# Patient Record
Sex: MALE | Race: WHITE | NOT HISPANIC OR LATINO | Employment: FULL TIME | URBAN - METROPOLITAN AREA
[De-identification: names, ages, dates, MRNs, and addresses within clinical notes are randomized per-mention and may not be internally consistent; named-entity substitution may affect disease eponyms.]

---

## 2021-05-25 ENCOUNTER — RECORDS - HEALTHEAST (OUTPATIENT)
Dept: ADMINISTRATIVE | Facility: CLINIC | Age: 51
End: 2021-05-25

## 2023-06-05 ENCOUNTER — OFFICE VISIT (OUTPATIENT)
Dept: FAMILY MEDICINE | Facility: CLINIC | Age: 53
End: 2023-06-05
Payer: MEDICAID

## 2023-06-05 VITALS
BODY MASS INDEX: 39.17 KG/M2 | HEIGHT: 75 IN | DIASTOLIC BLOOD PRESSURE: 80 MMHG | RESPIRATION RATE: 16 BRPM | OXYGEN SATURATION: 97 % | SYSTOLIC BLOOD PRESSURE: 124 MMHG | WEIGHT: 315 LBS | HEART RATE: 63 BPM

## 2023-06-05 DIAGNOSIS — E66.01 MORBID OBESITY (H): ICD-10-CM

## 2023-06-05 DIAGNOSIS — F41.1 GENERALIZED ANXIETY DISORDER: ICD-10-CM

## 2023-06-05 DIAGNOSIS — G47.33 OBSTRUCTIVE SLEEP APNEA SYNDROME: ICD-10-CM

## 2023-06-05 DIAGNOSIS — F10.21 ALCOHOL DEPENDENCE IN REMISSION (H): ICD-10-CM

## 2023-06-05 DIAGNOSIS — M1A.00X0 IDIOPATHIC CHRONIC GOUT WITHOUT TOPHUS, UNSPECIFIED SITE: ICD-10-CM

## 2023-06-05 DIAGNOSIS — I10 ESSENTIAL HYPERTENSION: ICD-10-CM

## 2023-06-05 DIAGNOSIS — I87.2 VENOUS (PERIPHERAL) INSUFFICIENCY: Primary | ICD-10-CM

## 2023-06-05 DIAGNOSIS — E78.5 HYPERLIPIDEMIA LDL GOAL <100: ICD-10-CM

## 2023-06-05 LAB
ALBUMIN SERPL BCG-MCNC: 4.2 G/DL (ref 3.5–5.2)
ALP SERPL-CCNC: 49 U/L (ref 40–129)
ALT SERPL W P-5'-P-CCNC: 14 U/L (ref 10–50)
ANION GAP SERPL CALCULATED.3IONS-SCNC: 12 MMOL/L (ref 7–15)
AST SERPL W P-5'-P-CCNC: 17 U/L (ref 10–50)
BILIRUB SERPL-MCNC: 0.2 MG/DL
BUN SERPL-MCNC: 21.6 MG/DL (ref 6–20)
CALCIUM SERPL-MCNC: 9.5 MG/DL (ref 8.6–10)
CHLORIDE SERPL-SCNC: 101 MMOL/L (ref 98–107)
CREAT SERPL-MCNC: 0.85 MG/DL (ref 0.67–1.17)
DEPRECATED HCO3 PLAS-SCNC: 29 MMOL/L (ref 22–29)
GFR SERPL CREATININE-BSD FRML MDRD: >90 ML/MIN/1.73M2
GLUCOSE SERPL-MCNC: 81 MG/DL (ref 70–99)
NT-PROBNP SERPL-MCNC: 898 PG/ML (ref 0–900)
POTASSIUM SERPL-SCNC: 3.8 MMOL/L (ref 3.4–5.3)
PROT SERPL-MCNC: 7.5 G/DL (ref 6.4–8.3)
SODIUM SERPL-SCNC: 142 MMOL/L (ref 136–145)
URATE SERPL-MCNC: 6.7 MG/DL (ref 3.4–7)

## 2023-06-05 PROCEDURE — 80053 COMPREHEN METABOLIC PANEL: CPT | Performed by: FAMILY MEDICINE

## 2023-06-05 PROCEDURE — 84550 ASSAY OF BLOOD/URIC ACID: CPT | Performed by: FAMILY MEDICINE

## 2023-06-05 PROCEDURE — 36415 COLL VENOUS BLD VENIPUNCTURE: CPT | Performed by: FAMILY MEDICINE

## 2023-06-05 PROCEDURE — 99204 OFFICE O/P NEW MOD 45 MIN: CPT | Performed by: FAMILY MEDICINE

## 2023-06-05 PROCEDURE — 83880 ASSAY OF NATRIURETIC PEPTIDE: CPT | Performed by: FAMILY MEDICINE

## 2023-06-05 RX ORDER — ASPIRIN 81 MG/1
81 TABLET ORAL DAILY
COMMUNITY
End: 2023-12-26

## 2023-06-05 RX ORDER — HYDROXYZINE HYDROCHLORIDE 50 MG/1
50 TABLET, FILM COATED ORAL EVERY 6 HOURS PRN
COMMUNITY
End: 2024-08-03

## 2023-06-05 RX ORDER — METOPROLOL SUCCINATE 100 MG/1
100 TABLET, EXTENDED RELEASE ORAL DAILY
COMMUNITY
End: 2024-05-08

## 2023-06-05 RX ORDER — ALLOPURINOL 100 MG/1
100 TABLET ORAL DAILY
COMMUNITY
End: 2023-12-26

## 2023-06-05 RX ORDER — SIMVASTATIN 20 MG
20 TABLET ORAL AT BEDTIME
COMMUNITY

## 2023-06-05 RX ORDER — LEVOTHYROXINE SODIUM 175 UG/1
175 TABLET ORAL DAILY
COMMUNITY
End: 2024-07-05

## 2023-06-05 RX ORDER — LOSARTAN POTASSIUM AND HYDROCHLOROTHIAZIDE 12.5; 1 MG/1; MG/1
1 TABLET ORAL DAILY
COMMUNITY
End: 2023-12-26 | Stop reason: SINTOL

## 2023-06-05 RX ORDER — SERTRALINE HYDROCHLORIDE 100 MG/1
100 TABLET, FILM COATED ORAL DAILY
COMMUNITY
End: 2024-05-08

## 2023-06-05 RX ORDER — NAPROXEN 500 MG/1
500 TABLET ORAL 2 TIMES DAILY WITH MEALS
COMMUNITY
End: 2023-12-26

## 2023-06-05 NOTE — PROGRESS NOTES
Assessment & Plan   Problem List Items Addressed This Visit    None  Visit Diagnoses     Venous (peripheral) insufficiency    -  Primary    Relevant Orders    Comprehensive metabolic panel (BMP + Alb, Alk Phos, ALT, AST, Total. Bili, TP) (Completed)    BNP-N terminal pro (Completed)    Physical Therapy Referral    Idiopathic chronic gout without tophus, unspecified site        Relevant Orders    Comprehensive metabolic panel (BMP + Alb, Alk Phos, ALT, AST, Total. Bili, TP) (Completed)    Uric acid (Completed)    Alcohol dependence in remission (H)        Essential hypertension        Relevant Medications    losartan-hydrochlorothiazide (HYZAAR) 100-12.5 MG tablet    Hyperlipidemia LDL goal <100        Relevant Medications    simvastatin (ZOCOR) 20 MG tablet    Generalized anxiety disorder        Relevant Medications    hydrOXYzine (ATARAX) 50 MG tablet    sertraline (ZOLOFT) 100 MG tablet      Patient is currently in town attending alcohol addiction programming.  He is in a stepdown program so he is allowed to have passes and leave the home during the day to go to work.  It is mandatory that he go to a daily 530 to 6:30 PM meeting.  He is not sure if he is going to move back to the [facility] when he completes the program in 2 months.  In the meantime he would like to establish care with a local provider.    Patient presents to clinic today with bilateral lower extremity swelling right greater than left.  He has previously been diagnosed with venous insufficiency and is on furosemide he believes however we do not have an up-to-date med list available.  He also shares with me that his thyroid-stimulating hormone level was recently checked as was his cholesterol panel.    We will have him meet with either PT or OT here in town at the [facility] for lymphedema clinic to see if we can get some compression hose that he is able to don and doff that provide support for his venous insufficiency.  He has  "no control over his diet at this time because it is provided for him at his group home.  He should try to keep his legs elevated is much as he can however he is working 10-hour days standing on concrete so this is not very realistic for him right now.    He has a history of gout but current meds show that he is on both allopurinol and hydrochlorothiazide.  Once we get an updated list of meds we can have a telephone appointment later this week to go over those and make some suggestions about changes.  Offered referral to [facility] pharmacist.  For now would like to work with me.    Advance care planning also discussed with patient today he was given a form that he is welcome to get completed and return to clinic.    History of obstructive sleep apnea and reports that its been years since he has had any follow-up with this.  We will have him schedule an appointment with Dr. Tobar.    Morbid obesity (hypertension, hyperlipidemia, venous insufficiency, obstructive sleep apnea), encouraged patient to work on increasing exercise and to make healthy food choices in the sense that he is able to while living in this group home.    Alcohol dependence he is completed in a residential intensive 3-month program and is now in a stepdown program.  He has been sober now for about 5 months.    Total time spent reviewing chart and preparing for appointment, with patient for appointment, and time spent charting and coordinating care on the day of the appointment in minutes was: 48                 BMI:   Estimated body mass index is 42.31 kg/m  as calculated from the following:    Height as of this encounter: 1.892 m (6' 2.5\").    Weight as of this encounter: 151.5 kg (334 lb).   Weight management plan: Discussed healthy diet and exercise guidelines        Porsche Robles MD  [facility]    Maria De Jesus Olivares is a 52 year old, presenting for the following health issues:  Establish Care (Pt is currently at " "the [facility]. He needs a PCP while here. ) and Joint Swelling (Pt c/o swollen ankles. )         View : No data to display.              History of Present Illness       Reason for visit:  New doctor    He eats 0-1 servings of fruits and vegetables daily.He consumes 2 sweetened beverage(s) daily.He exercises with enough effort to increase his heart rate 9 or less minutes per day.  He exercises with enough effort to increase his heart rate 4 days per week.   He is taking medications regularly.               Review of Systems         Objective    /80 (BP Location: Right arm, Patient Position: Sitting)   Pulse 63   Resp 16   Ht 1.892 m (6' 2.5\")   Wt (!) 151.5 kg (334 lb)   SpO2 97%   BMI 42.31 kg/m    Body mass index is 42.31 kg/m .  Physical Exam                       "

## 2023-06-05 NOTE — LETTER
Elen 15, 2023      Marshall Kendrick  [address] [address]        Dear ,    We are writing to inform you of your test results.    Test results indicate you may require additional follow up, see comment below.  Your abs are very reassuring. Your electrolytes look normal.  It looks like you may have been slightly dehydrated when your labs were done.  Your uric acid level however is higher than we would want to be for somebody that has a history of gout. I would like you to schedule a follow-up with me so that we can start to work on changing your blood pressure medicines and possibly to adjust your allopurinol.      Resulted Orders   Comprehensive metabolic panel (BMP + Alb, Alk Phos, ALT, AST, Total. Bili, TP)   Result Value Ref Range    Sodium 142 136 - 145 mmol/L    Potassium 3.8 3.4 - 5.3 mmol/L    Chloride 101 98 - 107 mmol/L    Carbon Dioxide (CO2) 29 22 - 29 mmol/L    Anion Gap 12 7 - 15 mmol/L    Urea Nitrogen 21.6 (H) 6.0 - 20.0 mg/dL    Creatinine 0.85 0.67 - 1.17 mg/dL    Calcium 9.5 8.6 - 10.0 mg/dL    Glucose 81 70 - 99 mg/dL    Alkaline Phosphatase 49 40 - 129 U/L    AST 17 10 - 50 U/L    ALT 14 10 - 50 U/L    Protein Total 7.5 6.4 - 8.3 g/dL    Albumin 4.2 3.5 - 5.2 g/dL    Bilirubin Total 0.2 <=1.2 mg/dL    GFR Estimate >90 >60 mL/min/1.73m2      Comment:      eGFR calculated using 2021 CKD-EPI equation.   BNP-N terminal pro   Result Value Ref Range    N Terminal Pro BNP Outpatient 898 0 - 900 pg/mL      Comment:      Reference range shown and results flagged as abnormal are for the outpatient, non acute settings. Establishing a baseline value for each individual patient is useful for follow-up.    Suggested inpatient cut points for confirming diagnosis of CHF in an acute setting are:  >450 pg/mL (age 18 to less than 50)  >900 pg/mL (age 50 to less than 75)  >1800 pg/mL (75 yrs and older)    An inpatient or emergency department NT-proPBNP <300 pg/mL effectively rules out  acute CHF, with 99% negative predictive value.       Uric acid   Result Value Ref Range    Uric Acid 6.7 3.4 - 7.0 mg/dL       If you have any questions or concerns, please call the clinic at the number listed above.       Sincerely,      Porsche Robles MD

## 2023-12-26 ENCOUNTER — OFFICE VISIT (OUTPATIENT)
Dept: FAMILY MEDICINE | Facility: CLINIC | Age: 53
End: 2023-12-26
Payer: COMMERCIAL

## 2023-12-26 VITALS
TEMPERATURE: 98 F | SYSTOLIC BLOOD PRESSURE: 124 MMHG | DIASTOLIC BLOOD PRESSURE: 76 MMHG | WEIGHT: 315 LBS | BODY MASS INDEX: 39.17 KG/M2 | RESPIRATION RATE: 16 BRPM | HEIGHT: 75 IN | OXYGEN SATURATION: 98 % | HEART RATE: 68 BPM

## 2023-12-26 DIAGNOSIS — E03.9 ACQUIRED HYPOTHYROIDISM: ICD-10-CM

## 2023-12-26 DIAGNOSIS — M1A.09X0 IDIOPATHIC CHRONIC GOUT OF MULTIPLE SITES WITHOUT TOPHUS: Primary | ICD-10-CM

## 2023-12-26 DIAGNOSIS — F10.91 ALCOHOL USE DISORDER IN REMISSION: ICD-10-CM

## 2023-12-26 DIAGNOSIS — F41.1 GAD (GENERALIZED ANXIETY DISORDER): ICD-10-CM

## 2023-12-26 DIAGNOSIS — I10 PRIMARY HYPERTENSION: ICD-10-CM

## 2023-12-26 DIAGNOSIS — E78.5 DYSLIPIDEMIA: ICD-10-CM

## 2023-12-26 LAB
ERYTHROCYTE [DISTWIDTH] IN BLOOD BY AUTOMATED COUNT: 14.5 % (ref 10–15)
HCT VFR BLD AUTO: 42.7 % (ref 40–53)
HGB BLD-MCNC: 14 G/DL (ref 13.3–17.7)
MCH RBC QN AUTO: 29.8 PG (ref 26.5–33)
MCHC RBC AUTO-ENTMCNC: 32.8 G/DL (ref 31.5–36.5)
MCV RBC AUTO: 91 FL (ref 78–100)
PLATELET # BLD AUTO: 226 10E3/UL (ref 150–450)
RBC # BLD AUTO: 4.7 10E6/UL (ref 4.4–5.9)
WBC # BLD AUTO: 8.2 10E3/UL (ref 4–11)

## 2023-12-26 PROCEDURE — 80053 COMPREHEN METABOLIC PANEL: CPT | Performed by: INTERNAL MEDICINE

## 2023-12-26 PROCEDURE — 99214 OFFICE O/P EST MOD 30 MIN: CPT | Performed by: INTERNAL MEDICINE

## 2023-12-26 PROCEDURE — 86200 CCP ANTIBODY: CPT | Performed by: INTERNAL MEDICINE

## 2023-12-26 PROCEDURE — 84443 ASSAY THYROID STIM HORMONE: CPT | Performed by: INTERNAL MEDICINE

## 2023-12-26 PROCEDURE — 85027 COMPLETE CBC AUTOMATED: CPT | Performed by: INTERNAL MEDICINE

## 2023-12-26 PROCEDURE — 86140 C-REACTIVE PROTEIN: CPT | Performed by: INTERNAL MEDICINE

## 2023-12-26 PROCEDURE — 84550 ASSAY OF BLOOD/URIC ACID: CPT | Performed by: INTERNAL MEDICINE

## 2023-12-26 PROCEDURE — 36415 COLL VENOUS BLD VENIPUNCTURE: CPT | Performed by: INTERNAL MEDICINE

## 2023-12-26 RX ORDER — PREDNISONE 5 MG/1
TABLET ORAL
Qty: 43 TABLET | Refills: 0 | Status: SHIPPED | OUTPATIENT
Start: 2023-12-26 | End: 2024-01-15

## 2023-12-26 RX ORDER — LOSARTAN POTASSIUM 100 MG/1
100 TABLET ORAL DAILY
Qty: 90 TABLET | Refills: 3 | Status: SHIPPED | OUTPATIENT
Start: 2023-12-26 | End: 2024-08-03

## 2023-12-26 RX ORDER — CELECOXIB 200 MG/1
200 CAPSULE ORAL DAILY
Qty: 90 CAPSULE | Refills: 3 | Status: SHIPPED | OUTPATIENT
Start: 2023-12-26 | End: 2024-02-02

## 2023-12-26 RX ORDER — ALLOPURINOL 100 MG/1
100 TABLET ORAL DAILY
Qty: 90 TABLET | Refills: 3 | Status: SHIPPED | OUTPATIENT
Start: 2023-12-26 | End: 2024-02-02 | Stop reason: DRUGHIGH

## 2023-12-26 ASSESSMENT — ENCOUNTER SYMPTOMS
NAUSEA: 0
UNEXPECTED WEIGHT CHANGE: 0
CHILLS: 0
MYALGIAS: 0
DIARRHEA: 0
DIFFICULTY URINATING: 0
VOMITING: 0
SHORTNESS OF BREATH: 0
PALPITATIONS: 0
BRUISES/BLEEDS EASILY: 0
SLEEP DISTURBANCE: 0
FEVER: 0
ARTHRALGIAS: 1
JOINT SWELLING: 1
ABDOMINAL PAIN: 0
DYSURIA: 0
COUGH: 0
FATIGUE: 0
HEADACHES: 0

## 2023-12-26 NOTE — LETTER
January 4, 2024      Marshall Kendrick  [address] [address]        Dear ,    We are writing to inform you of your test results.    TSH elevated. Be sure to take Levothyroxine every day on an empty stomach.   Inflammatory markers elevated but no evidence of rheumatoid arthritis. Procede with plan and schedule a visit in 2-4 weeks for follow-up.     Resulted Orders   Comprehensive metabolic panel (BMP + Alb, Alk Phos, ALT, AST, Total. Bili, TP)   Result Value Ref Range    Sodium 141 135 - 145 mmol/L      Comment:      Reference intervals for this test were updated on 09/26/2023 to more accurately reflect our healthy population. There may be differences in the flagging of prior results with similar values performed with this method. Interpretation of those prior results can be made in the context of the updated reference intervals.     Potassium 4.1 3.4 - 5.3 mmol/L    Carbon Dioxide (CO2) 26 22 - 29 mmol/L    Anion Gap 11 7 - 15 mmol/L    Urea Nitrogen 15.4 6.0 - 20.0 mg/dL    Creatinine 0.88 0.67 - 1.17 mg/dL    GFR Estimate >90 >60 mL/min/1.73m2    Calcium 9.1 8.6 - 10.0 mg/dL    Chloride 104 98 - 107 mmol/L    Glucose 87 70 - 99 mg/dL    Alkaline Phosphatase 68 40 - 150 U/L      Comment:      Reference intervals for this test were updated on 11/14/2023 to more accurately reflect our healthy population. There may be differences in the flagging of prior results with similar values performed with this method. Interpretation of those prior results can be made in the context of the updated reference intervals.    AST 22 0 - 45 U/L      Comment:      Reference intervals for this test were updated on 6/12/2023 to more accurately reflect our healthy population. There may be differences in the flagging of prior results with similar values performed with this method. Interpretation of those prior results can be made in the context of the updated reference intervals.    ALT 14 0 - 70 U/L       Comment:      Reference intervals for this test were updated on 6/12/2023 to more accurately reflect our healthy population. There may be differences in the flagging of prior results with similar values performed with this method. Interpretation of those prior results can be made in the context of the updated reference intervals.      Protein Total 7.4 6.4 - 8.3 g/dL    Albumin 4.2 3.5 - 5.2 g/dL    Bilirubin Total 0.2 <=1.2 mg/dL   Uric acid   Result Value Ref Range    Uric Acid 6.1 3.4 - 7.0 mg/dL   CBC with platelets   Result Value Ref Range    WBC Count 8.2 4.0 - 11.0 10e3/uL    RBC Count 4.70 4.40 - 5.90 10e6/uL    Hemoglobin 14.0 13.3 - 17.7 g/dL    Hematocrit 42.7 40.0 - 53.0 %    MCV 91 78 - 100 fL    MCH 29.8 26.5 - 33.0 pg    MCHC 32.8 31.5 - 36.5 g/dL    RDW 14.5 10.0 - 15.0 %    Platelet Count 226 150 - 450 10e3/uL   TSH   Result Value Ref Range    TSH 32.70 (H) 0.30 - 4.20 uIU/mL   CRP inflammation   Result Value Ref Range    CRP Inflammation 7.48 (H) <5.00 mg/L   Cyclic Citrullinated Peptide Antibody IgG   Result Value Ref Range    Cyclic Citrullinated Peptide Antibody IgG 1.9 <7.0 U/mL      Comment:      Negative       If you have any questions or concerns, please call the clinic at the number listed above.       Sincerely,      Huang Tobar MD

## 2023-12-26 NOTE — PROGRESS NOTES
Assessment & Plan   Problem List Items Addressed This Visit       Primary hypertension     Controlled with regimen of metoprolol  mg daily, losartan 100 mg daily, hydrochlorothiazide 12.5 mg daily  Discontinue hydrochlorothiazide due to gout         Relevant Medications    losartan (COZAAR) 100 MG tablet    Other Relevant Orders    Comprehensive metabolic panel (BMP + Alb, Alk Phos, ALT, AST, Total. Bili, TP)    PRIMARY CARE FOLLOW-UP SCHEDULING    Idiopathic chronic gout of multiple sites without tophus - Primary     Patient with history of gout presents with 3 weeks of bilateral wrist arthritis  He is now on hydrochlorothiazide and not on allopurinol  Multiple attacks of podagra while in active alcoholic  Differential including rheumatoid arthritis    Lab workup  Resume allopurinol with Celebrex daily  Prednisone taper for acute symptoms  Discontinue hydrochlorothiazide         Relevant Medications    allopurinol (ZYLOPRIM) 100 MG tablet    celecoxib (CELEBREX) 200 MG capsule    predniSONE (DELTASONE) 5 MG tablet    Other Relevant Orders    Comprehensive metabolic panel (BMP + Alb, Alk Phos, ALT, AST, Total. Bili, TP)    Uric acid    CBC with platelets    CRP inflammation    Cyclic Citrullinated Peptide Antibody IgG    PRIMARY CARE FOLLOW-UP SCHEDULING    Alcohol use disorder in remission     Sober since spring 2023         Acquired hypothyroidism    Relevant Orders    TSH    TEZ (generalized anxiety disorder)     Well-controlled for many years with sertraline 100 mg daily         Dyslipidemia     On statin                      Nicotine/Tobacco Cessation:  He reports that he has been smoking cigarettes. He has a 26.3 pack-year smoking history. He has never used smokeless tobacco.  Nicotine/Tobacco Cessation Plan:   Information offered: Patient not interested at this time      BMI:   Estimated body mass index is 42.44 kg/m  as calculated from the following:    Height as of this encounter: 1.892 m (6'  "2.5\").    Weight as of this encounter: 152 kg (335 lb).   Weight management plan: Patient was referred to their PCP to discuss a diet and exercise plan.    Follow-up in a few weeks.    Huang Tobar MD  [facility]    Maria De Jesus Olivares is a 53 year old, presenting for the following health issues:  Musculoskeletal Problem        12/26/2023     4:46 PM   Additional Questions   Roomed by LA       History of Present Illness       Reason for visit:  Soreness in joints  Symptom onset:  3-4 weeks ago  Symptoms include:  Sore wrists knees and feet  Symptom intensity:  Severe  Symptom progression:  Worsening  Had these symptoms before:  Yes  Has tried/received treatment for these symptoms:  Yes  Previous treatment was successful:  Yes  Prior treatment description:  Had goute in feet they were really sore on toes took meds  What makes it worse:  Moving  What makes it better:  No    He eats 0-1 servings of fruits and vegetables daily.He consumes 2 sweetened beverage(s) daily.He exercises with enough effort to increase his heart rate 9 or less minutes per day.  He exercises with enough effort to increase his heart rate 4 days per week. He is missing 1 dose(s) of medications per week.  He is not taking prescribed medications regularly due to remembering to take.     Patient has been sober since spring and it is now nearly 9 months.  He is living in a sober house in [place] and likes it here.  He has a job at a manufacturing plant.  He is very happy with this current situation.  He complains of bilateral wrist pain and swelling for about a month.  Involving the knees but much less so.  Works with his hands in a manufacturing plant.  Was on allopurinol but has not been on that in the number of months and has been started on hydrochlorothiazide for his blood pressure.              Review of Systems   Constitutional:  Negative for chills, fatigue, fever and unexpected weight change.   Respiratory:  " "Negative for cough and shortness of breath.    Cardiovascular:  Negative for chest pain, palpitations and peripheral edema.   Gastrointestinal:  Negative for abdominal pain, diarrhea, nausea and vomiting.   Endocrine: Negative for polyuria.   Genitourinary:  Negative for difficulty urinating and dysuria.   Musculoskeletal:  Positive for arthralgias and joint swelling. Negative for myalgias.   Skin:  Negative for rash.   Neurological:  Negative for headaches.   Hematological:  Does not bruise/bleed easily.   Psychiatric/Behavioral:  Negative for sleep disturbance.             Objective    /76   Pulse 68   Temp 98  F (36.7  C) (Tympanic)   Resp 16   Ht 1.892 m (6' 2.5\")   Wt (!) 152 kg (335 lb)   SpO2 98%   BMI 42.44 kg/m    Body mass index is 42.44 kg/m .  Physical Exam   Obese man in no distress  Alert and oriented  In good spirits  Examination of the joints reveals tenderness and mild swelling of both wrists but not hands feet ankles or knees                      "

## 2023-12-26 NOTE — ASSESSMENT & PLAN NOTE
Controlled with regimen of metoprolol  mg daily, losartan 100 mg daily, hydrochlorothiazide 12.5 mg daily  Discontinue hydrochlorothiazide due to gout

## 2023-12-26 NOTE — ASSESSMENT & PLAN NOTE
Patient with history of gout presents with 3 weeks of bilateral wrist arthritis  He is now on hydrochlorothiazide and not on allopurinol  Multiple attacks of podagra while in active alcoholic  Differential including rheumatoid arthritis    Lab workup  Resume allopurinol with Celebrex daily  Prednisone taper for acute symptoms  Discontinue hydrochlorothiazide

## 2023-12-27 LAB
ALBUMIN SERPL BCG-MCNC: 4.2 G/DL (ref 3.5–5.2)
ALP SERPL-CCNC: 68 U/L (ref 40–150)
ALT SERPL W P-5'-P-CCNC: 14 U/L (ref 0–70)
ANION GAP SERPL CALCULATED.3IONS-SCNC: 11 MMOL/L (ref 7–15)
AST SERPL W P-5'-P-CCNC: 22 U/L (ref 0–45)
BILIRUB SERPL-MCNC: 0.2 MG/DL
BUN SERPL-MCNC: 15.4 MG/DL (ref 6–20)
CALCIUM SERPL-MCNC: 9.1 MG/DL (ref 8.6–10)
CHLORIDE SERPL-SCNC: 104 MMOL/L (ref 98–107)
CREAT SERPL-MCNC: 0.88 MG/DL (ref 0.67–1.17)
CRP SERPL-MCNC: 7.48 MG/L
DEPRECATED HCO3 PLAS-SCNC: 26 MMOL/L (ref 22–29)
EGFRCR SERPLBLD CKD-EPI 2021: >90 ML/MIN/1.73M2
GLUCOSE SERPL-MCNC: 87 MG/DL (ref 70–99)
POTASSIUM SERPL-SCNC: 4.1 MMOL/L (ref 3.4–5.3)
PROT SERPL-MCNC: 7.4 G/DL (ref 6.4–8.3)
SODIUM SERPL-SCNC: 141 MMOL/L (ref 135–145)
TSH SERPL DL<=0.005 MIU/L-ACNC: 32.7 UIU/ML (ref 0.3–4.2)
URATE SERPL-MCNC: 6.1 MG/DL (ref 3.4–7)

## 2023-12-28 LAB — CCP AB SER IA-ACNC: 1.9 U/ML

## 2024-01-29 DIAGNOSIS — M1A.09X0 IDIOPATHIC CHRONIC GOUT OF MULTIPLE SITES WITHOUT TOPHUS: Primary | ICD-10-CM

## 2024-01-29 RX ORDER — PREDNISONE 20 MG/1
40 TABLET ORAL DAILY
Qty: 6 TABLET | Refills: 0 | Status: SHIPPED | OUTPATIENT
Start: 2024-01-29 | End: 2024-02-02

## 2024-01-29 NOTE — TELEPHONE ENCOUNTER
S-(situation):   Patient having a gout flare    B-(background):   Patient was seen 12/26/2023, with Dr Tobar    A-(assessment):   Patient taking 100 mg Allopurinol daily this is not effective with this. Wrist is painful and swollen    R-(recommendations):    Patient has had steroid in the past that has worked    Patient has an appt scheduled for 2/2/2024    FANY Griffith  [facility]

## 2024-01-29 NOTE — CONFIDENTIAL NOTE
Please let patient know prednisone was [facility] to [facility], would like to get together to review meds to discuss risk of gout

## 2024-02-02 ENCOUNTER — OFFICE VISIT (OUTPATIENT)
Dept: FAMILY MEDICINE | Facility: CLINIC | Age: 54
End: 2024-02-02
Payer: COMMERCIAL

## 2024-02-02 VITALS
OXYGEN SATURATION: 97 % | WEIGHT: 315 LBS | TEMPERATURE: 97 F | HEIGHT: 75 IN | DIASTOLIC BLOOD PRESSURE: 76 MMHG | BODY MASS INDEX: 39.17 KG/M2 | HEART RATE: 68 BPM | RESPIRATION RATE: 20 BRPM | SYSTOLIC BLOOD PRESSURE: 130 MMHG

## 2024-02-02 DIAGNOSIS — M10.031 ACUTE IDIOPATHIC GOUT OF RIGHT WRIST: Primary | ICD-10-CM

## 2024-02-02 PROCEDURE — 99213 OFFICE O/P EST LOW 20 MIN: CPT | Performed by: PHYSICIAN ASSISTANT

## 2024-02-02 RX ORDER — ALLOPURINOL 300 MG/1
300 TABLET ORAL DAILY
Qty: 90 TABLET | Refills: 1 | Status: SHIPPED | OUTPATIENT
Start: 2024-02-02 | End: 2024-05-08

## 2024-02-02 RX ORDER — INDOMETHACIN 50 MG/1
50 CAPSULE ORAL 2 TIMES DAILY WITH MEALS
Qty: 21 CAPSULE | Refills: 1 | Status: SHIPPED | OUTPATIENT
Start: 2024-02-02 | End: 2024-05-08

## 2024-02-02 NOTE — PROGRESS NOTES
"  Assessment & Plan     (M10.031) Acute idiopathic gout of right wrist  (primary encounter diagnosis)  Comment: Increase allopurinol to 300 mg will give Indocin for a week he will follow-up as needed  Plan: indomethacin (INDOCIN) 50 MG capsule,         allopurinol (ZYLOPRIM) 300 MG tablet                          Maria De Jesus Olivares is a 53 year old, presenting for the following health issues:  Wrist Pain (Right wrist pain, recently treated for gout flare )        2/2/2024    10:01 AM   Additional Questions   Roomed by DAMIÁN Saldana     53-year-old presents to the clinic with complaint of a gout flare it is in the right wrist.  He recently has been having gout attacks  His uric acid level is not significantly elevated he is on low-dose allopurinol  His hydrochlorothiazide was discontinued and he thought that maybe this was precipitated attack this has not made a difference.  He is a alcoholic has been sober for 10 months is living in a sober house here in [place] he is employed  Denies any injury  He has been on prednisone for few days did not seem to improve his symptoms    History of Present Illness       Reason for visit:  Sorenes in wrist    He eats 0-1 servings of fruits and vegetables daily.He consumes 2 sweetened beverage(s) daily.He exercises with enough effort to increase his heart rate 20 to 29 minutes per day.  He exercises with enough effort to increase his heart rate 3 or less days per week. He is missing 1 dose(s) of medications per week.                     Objective    /76 (BP Location: Right arm, Patient Position: Sitting, Cuff Size: Adult Large)   Pulse 68   Temp 97  F (36.1  C) (Tympanic)   Resp 20   Ht 1.892 m (6' 2.5\")   Wt (!) 159 kg (350 lb 9.6 oz)   SpO2 97%   BMI 44.41 kg/m    Body mass index is 44.41 kg/m .  Physical Exam swelling to the right wrist over the radial aspect mild erythema warmth tender to touch distal neurovascular status intact              Signed Electronically " by: aRmez Segovia, PA

## 2024-05-08 ENCOUNTER — OFFICE VISIT (OUTPATIENT)
Dept: FAMILY MEDICINE | Facility: CLINIC | Age: 54
End: 2024-05-08
Payer: COMMERCIAL

## 2024-05-08 VITALS
DIASTOLIC BLOOD PRESSURE: 86 MMHG | WEIGHT: 315 LBS | RESPIRATION RATE: 18 BRPM | BODY MASS INDEX: 39.17 KG/M2 | HEART RATE: 76 BPM | TEMPERATURE: 98.2 F | SYSTOLIC BLOOD PRESSURE: 130 MMHG | HEIGHT: 75 IN | OXYGEN SATURATION: 98 %

## 2024-05-08 DIAGNOSIS — E03.9 ACQUIRED HYPOTHYROIDISM: ICD-10-CM

## 2024-05-08 DIAGNOSIS — Z12.11 SCREEN FOR COLON CANCER: ICD-10-CM

## 2024-05-08 DIAGNOSIS — I10 ESSENTIAL HYPERTENSION: ICD-10-CM

## 2024-05-08 DIAGNOSIS — F41.1 GAD (GENERALIZED ANXIETY DISORDER): ICD-10-CM

## 2024-05-08 DIAGNOSIS — E78.5 HYPERLIPIDEMIA LDL GOAL <100: Primary | ICD-10-CM

## 2024-05-08 DIAGNOSIS — Z11.59 NEED FOR HEPATITIS C SCREENING TEST: ICD-10-CM

## 2024-05-08 DIAGNOSIS — Z11.4 SCREENING FOR HIV (HUMAN IMMUNODEFICIENCY VIRUS): ICD-10-CM

## 2024-05-08 DIAGNOSIS — M10.031 ACUTE IDIOPATHIC GOUT OF RIGHT WRIST: ICD-10-CM

## 2024-05-08 PROCEDURE — 99213 OFFICE O/P EST LOW 20 MIN: CPT | Performed by: PHYSICIAN ASSISTANT

## 2024-05-08 RX ORDER — SERTRALINE HYDROCHLORIDE 100 MG/1
100 TABLET, FILM COATED ORAL DAILY
Qty: 90 TABLET | Refills: 3 | Status: SHIPPED | OUTPATIENT
Start: 2024-05-08

## 2024-05-08 RX ORDER — MELOXICAM 15 MG/1
15 TABLET ORAL DAILY
Qty: 30 TABLET | Refills: 0 | Status: SHIPPED | OUTPATIENT
Start: 2024-05-08 | End: 2024-08-03

## 2024-05-08 RX ORDER — METOPROLOL SUCCINATE 100 MG/1
100 TABLET, EXTENDED RELEASE ORAL DAILY
Qty: 90 TABLET | Refills: 3 | Status: SHIPPED | OUTPATIENT
Start: 2024-05-08

## 2024-05-08 ASSESSMENT — ANXIETY QUESTIONNAIRES
IF YOU CHECKED OFF ANY PROBLEMS ON THIS QUESTIONNAIRE, HOW DIFFICULT HAVE THESE PROBLEMS MADE IT FOR YOU TO DO YOUR WORK, TAKE CARE OF THINGS AT HOME, OR GET ALONG WITH OTHER PEOPLE: NOT DIFFICULT AT ALL
1. FEELING NERVOUS, ANXIOUS, OR ON EDGE: SEVERAL DAYS
8. IF YOU CHECKED OFF ANY PROBLEMS, HOW DIFFICULT HAVE THESE MADE IT FOR YOU TO DO YOUR WORK, TAKE CARE OF THINGS AT HOME, OR GET ALONG WITH OTHER PEOPLE?: NOT DIFFICULT AT ALL
4. TROUBLE RELAXING: NOT AT ALL
3. WORRYING TOO MUCH ABOUT DIFFERENT THINGS: MORE THAN HALF THE DAYS
GAD7 TOTAL SCORE: 6
GAD7 TOTAL SCORE: 6
2. NOT BEING ABLE TO STOP OR CONTROL WORRYING: SEVERAL DAYS
6. BECOMING EASILY ANNOYED OR IRRITABLE: SEVERAL DAYS
5. BEING SO RESTLESS THAT IT IS HARD TO SIT STILL: NOT AT ALL
7. FEELING AFRAID AS IF SOMETHING AWFUL MIGHT HAPPEN: SEVERAL DAYS
7. FEELING AFRAID AS IF SOMETHING AWFUL MIGHT HAPPEN: SEVERAL DAYS

## 2024-06-08 ENCOUNTER — ORDERS ONLY (AUTO-RELEASED) (OUTPATIENT)
Dept: FAMILY MEDICINE | Facility: CLINIC | Age: 54
End: 2024-06-08
Payer: COMMERCIAL

## 2024-06-08 DIAGNOSIS — Z12.11 SCREEN FOR COLON CANCER: ICD-10-CM

## 2024-07-05 ENCOUNTER — OFFICE VISIT (OUTPATIENT)
Dept: FAMILY MEDICINE | Facility: CLINIC | Age: 54
End: 2024-07-05
Payer: COMMERCIAL

## 2024-07-05 VITALS
TEMPERATURE: 97.6 F | HEART RATE: 55 BPM | DIASTOLIC BLOOD PRESSURE: 92 MMHG | RESPIRATION RATE: 18 BRPM | WEIGHT: 315 LBS | OXYGEN SATURATION: 98 % | BODY MASS INDEX: 39.17 KG/M2 | HEIGHT: 75 IN | SYSTOLIC BLOOD PRESSURE: 138 MMHG

## 2024-07-05 DIAGNOSIS — E78.5 HYPERLIPIDEMIA LDL GOAL <100: ICD-10-CM

## 2024-07-05 DIAGNOSIS — Z11.4 SCREENING FOR HIV (HUMAN IMMUNODEFICIENCY VIRUS): ICD-10-CM

## 2024-07-05 DIAGNOSIS — I10 ESSENTIAL HYPERTENSION: Primary | ICD-10-CM

## 2024-07-05 DIAGNOSIS — Z11.59 NEED FOR HEPATITIS C SCREENING TEST: ICD-10-CM

## 2024-07-05 DIAGNOSIS — R10.9 FLANK PAIN: ICD-10-CM

## 2024-07-05 DIAGNOSIS — E03.9 ACQUIRED HYPOTHYROIDISM: ICD-10-CM

## 2024-07-05 LAB
ALBUMIN UR-MCNC: NEGATIVE MG/DL
APPEARANCE UR: CLEAR
BILIRUB UR QL STRIP: NEGATIVE
COLOR UR AUTO: YELLOW
GLUCOSE UR STRIP-MCNC: NEGATIVE MG/DL
HGB UR QL STRIP: NEGATIVE
KETONES UR STRIP-MCNC: NEGATIVE MG/DL
LEUKOCYTE ESTERASE UR QL STRIP: NEGATIVE
NITRATE UR QL: NEGATIVE
PH UR STRIP: 6 [PH] (ref 5–7)
SP GR UR STRIP: 1.02 (ref 1–1.03)
UROBILINOGEN UR STRIP-ACNC: 0.2 E.U./DL

## 2024-07-05 PROCEDURE — 81003 URINALYSIS AUTO W/O SCOPE: CPT | Mod: QW | Performed by: PHYSICIAN ASSISTANT

## 2024-07-05 PROCEDURE — 99214 OFFICE O/P EST MOD 30 MIN: CPT | Performed by: PHYSICIAN ASSISTANT

## 2024-07-05 RX ORDER — LEVOTHYROXINE SODIUM 175 UG/1
175 TABLET ORAL DAILY
Qty: 90 TABLET | Refills: 3 | Status: SHIPPED | OUTPATIENT
Start: 2024-07-05

## 2024-07-05 ASSESSMENT — ENCOUNTER SYMPTOMS: BACK PAIN: 1

## 2024-07-05 NOTE — PROGRESS NOTES
"  Assessment & Plan     (I10) Essential hypertension  (primary encounter diagnosis)  Comment: Controlled  Plan: Be consistent with taking medication    (E03.9) Acquired hypothyroidism  Comment: Off meds  Plan: levothyroxine (SYNTHROID/LEVOTHROID) 175 MCG         tablet        Get back on medication recheck TSH in 2 months prior as needed    (R10.9) Flank pain  Comment: Observe  Plan: UA Macroscopic with reflex to Microscopic and         Culture - Clinic Collect        If not improved in 7 to 10 days to recheck urine pending    (E78.5) Hyperlipidemia LDL goal <100  Comment: Will check these labs in 2 months  Plan:     (Z11.4) Screening for HIV (human immunodeficiency virus)  Comment: deferPlan:     (Z11.59) Need for hepatitis C screening test  Comment: defer  Plan:           BMI  Estimated body mass index is 44.96 kg/m  as calculated from the following:    Height as of this encounter: 1.894 m (6' 2.56\").    Weight as of this encounter: 161.3 kg (355 lb 8 oz).             Subjective   Milton is a 53 year old, presenting for the following health issues:  Follow Up (Medication check  ) and Back Pain (Low back pain)      7/5/2024    12:15 PM   Additional Questions   Roomed by DAMIÁN Saldana     (Clinic follow-up evaluation for his chronic disease and more recent bilateral flank pain he is doing more physical work at his job site denies any specific injury when he is up busy and active it does not bother her as much when he rests then it bothers more  Has had no hematuria no fever chills no rash  Has been off his thyroid medication for a while because he did not get it refilled and work with his prior clinic in [place] now we will work through us  No chest pain shortness of breath    History of Present Illness       Reason for visit:  Med follow up and side pain  Symptom onset:  1-2 weeks ago  Symptoms include:  Pain rite above love handles  Symptom intensity:  Moderate  Symptom progression:  Staying the same  Had these " "symptoms before:  No  What makes it worse:  After work i lay back pain sitting up  What makes it better:  Movement    He eats 0-1 servings of fruits and vegetables daily.He consumes 1 sweetened beverage(s) daily.He exercises with enough effort to increase his heart rate 20 to 29 minutes per day.  He exercises with enough effort to increase his heart rate 4 days per week. He is missing 1 dose(s) of medications per week.                   Objective    BP (!) 140/100 (BP Location: Right arm, Patient Position: Sitting, Cuff Size: Adult Large)   Pulse 55   Temp 97.6  F (36.4  C) (Tympanic)   Resp 18   Ht 1.894 m (6' 2.56\")   Wt (!) 161.3 kg (355 lb 8 oz)   SpO2 98%   BMI 44.96 kg/m    Body mass index is 44.96 kg/m .  Physical Exam alert attentive no acute distress  Oropharynx benign  Neck supple no adenopathy  No lungs clear well ventilated  Cardiovascular regular rate and rhythm  Abdomen rotund nontender  No rash no CVA tenderness good range of motion              Signed Electronically by: LORENA Chávez    "

## 2024-07-05 NOTE — LETTER
August 16, 2024      Milton Kendrick  [address] [facility] [address] [place] [id number]        Dear ,    We are writing to inform you of your test results.    Your lipids are fine.  Your HIV and hepatitis C were negative as expected.  Your thyroid function tests are approaching normal.  Continue your current dose of thyroid medication and we will repeat that in 2 months.  I will be in touch after I get your CT results.  Let us know if your symptoms worsen.    Resulted Orders   UA Macroscopic with reflex to Microscopic and Culture - Clinic Collect   Result Value Ref Range    Color Urine Yellow Colorless, Straw, Light Yellow, Yellow    Appearance Urine Clear Clear    Glucose Urine Negative Negative mg/dL    Bilirubin Urine Negative Negative    Ketones Urine Negative Negative mg/dL    Specific Gravity Urine 1.020 1.003 - 1.035    Blood Urine Negative Negative    pH Urine 6.0 5.0 - 7.0    Protein Albumin Urine Negative Negative mg/dL    Urobilinogen Urine 0.2 0.2, 1.0 E.U./dL    Nitrite Urine Negative Negative    Leukocyte Esterase Urine Negative Negative    Narrative    Microscopic not indicated   Lipid panel reflex to direct LDL Non-fasting   Result Value Ref Range    Cholesterol 172 <200 mg/dL    Triglycerides 222 (H) <150 mg/dL    Direct Measure HDL 35 (L) >=40 mg/dL    LDL Cholesterol Calculated 93 <=100 mg/dL    Non HDL Cholesterol 137 (H) <130 mg/dL    Patient Fasting > 8hrs? No     Narrative    Cholesterol  Desirable:  <200 mg/dL    Triglycerides  Normal:  Less than 150 mg/dL  Borderline High:  150-199 mg/dL  High:  200-499 mg/dL  Very High:  Greater than or equal to 500 mg/dL    Direct Measure HDL  Female:  Greater than or equal to 50 mg/dL   Male:  Greater than or equal to 40 mg/dL    LDL Cholesterol  Desirable:  <100mg/dL  Above Desirable:  100-129 mg/dL   Borderline High:  130-159 mg/dL   High:  160-189 mg/dL   Very High:  >= 190 mg/dL    Non HDL Cholesterol  Desirable:  130 mg/dL  Above  Desirable:  130-159 mg/dL  Borderline High:  160-189 mg/dL  High:  190-219 mg/dL  Very High:  Greater than or equal to 220 mg/dL   HIV Antigen Antibody Combo   Result Value Ref Range    HIV Antigen Antibody Combo Nonreactive Nonreactive      Comment:      Negative HIV-1 p24 antigen and HIV-1/2 antibody screening test results usually indicate the absence of HIV-1 and HIV-2 infection. However, such negative results do not rule-out acute HIV infection.  If acute HIV-1 or HIV-2 infection is suspected, detection of HIV-1 or HIV-2 RNA  is recommended.    Hepatitis C Screen Reflex to HCV RNA Quant and Genotype   Result Value Ref Range    Hepatitis C Antibody Nonreactive Nonreactive      Comment:      A nonreactive screening test result does not exclude the possibility of exposure to or infection with HCV. Nonreactive screening test results in individuals with prior exposure to HCV may be due to antibody levels below the limit of detection of this assay or lack of reactivity to the HCV antigens used in this assay. Patients with recent HCV infections (<3 months from time of exposure) may have false-negative HCV antibody results due to the time needed for seroconversion (average of 8 to 9 weeks).   TSH with free T4 reflex   Result Value Ref Range    TSH 16.90 (H) 0.30 - 4.20 uIU/mL   T4 free   Result Value Ref Range    Free T4 0.85 (L) 0.90 - 1.70 ng/dL       If you have any questions or concerns, please call the clinic at the number listed above.       Sincerely,      LORENA Lanier

## 2024-08-01 ENCOUNTER — NURSE TRIAGE (OUTPATIENT)
Dept: FAMILY MEDICINE | Facility: CLINIC | Age: 54
End: 2024-08-01
Payer: COMMERCIAL

## 2024-08-01 NOTE — TELEPHONE ENCOUNTER
yesterdayNurse Triage SBAR    Is this a 2nd Level Triage? 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     Assessment: Patient reports pain started yesterday. Patient has experienced back pain in past, patient reports he has been given muscle relaxer in past. Patient canceled office visit today, reports he is unable to get into his vehicle.  Patient does radiate down thigh. Advised patient of home care recommendations. Patient verbalized understanding     Protocol Recommended Disposition:   See in Office Within 3 Days    Recommendation: Assisted patient in scheduling an office visit for tomorrow. Advised patient if any worsening symptoms should be seen in ED prior to office visit if needed.      Office visit scheduled    Does the patient meet one of the following criteria for ADS visit consideration? No    Reason for Disposition   Pain radiates into the thigh or further down the leg    Additional Information   Negative: Passed out (i.e., fainted, collapsed and was not responding)   Negative: Shock suspected (e.g., cold/pale/clammy skin, too weak to stand, low BP, rapid pulse)   Negative: Sounds like a life-threatening emergency to the triager   Negative: Major injury to the back (e.g., MVA, fall > 10 feet or 3 meters, penetrating injury, etc.)   Negative: Pain in the upper back over the ribs (rib cage) that radiates (travels) into the chest   Negative: Pain in the upper back over the ribs (rib cage) and worsened by coughing (or clearly increases with breathing)   Negative: Back pain during pregnancy   Negative: SEVERE back pain of sudden onset and age > 60 years   Negative: SEVERE abdominal pain (e.g., excruciating)   Negative: Abdominal pain and age > 60 years   Negative: Unable to urinate (or only a few drops) and bladder feels very full   Negative: Loss of bladder or bowel control (urine or bowel incontinence; wetting self,  leaking stool) of new-onset   Negative: Numbness (loss of sensation) in groin or rectal area   Negative: Pain radiates into groin, scrotum   Negative: Blood in urine (red, pink, or tea-colored)   Negative: Vomiting and pain over lower ribs of back (i.e., flank - kidney area)   Negative: Weakness of a leg or foot (e.g., unable to bear weight, dragging foot)   Negative: Patient sounds very sick or weak to the triager   Negative: Fever > 100.4 F (38.0 C) and flank pain   Negative: Pain or burning with passing urine (urination)   Negative: SEVERE back pain (e.g., excruciating, unable to do any normal activities) and not improved after pain medicine and CARE ADVICE   Negative: Numbness in an arm or hand (i.e., loss of sensation) and upper back pain   Negative: Numbness in a leg or foot (i.e., loss of sensation)   Negative: High-risk adult (e.g., history of cancer, history of HIV, or history of IV Drug Use)   Negative: Soft tissue infection (e.g., abscess, cellulitis) or other serious infection (e.g., bacteremia) in last 2 weeks   Negative: Painful rash with multiple small blisters grouped together (i.e., dermatomal distribution or 'band' or 'stripe')   Negative: Pain radiates into the thigh or further down the leg, and in both legs   Negative: Age > 50 and no history of prior similar back pain   Negative: MODERATE back pain (e.g., interferes with normal activities) and present > 3 days    Protocols used: Back Pain-A-[place]

## 2024-08-03 ENCOUNTER — OFFICE VISIT (OUTPATIENT)
Dept: URGENT CARE | Facility: URGENT CARE | Age: 54
End: 2024-08-03
Payer: COMMERCIAL

## 2024-08-03 VITALS
HEART RATE: 120 BPM | WEIGHT: 315 LBS | DIASTOLIC BLOOD PRESSURE: 108 MMHG | TEMPERATURE: 97.5 F | BODY MASS INDEX: 43.72 KG/M2 | SYSTOLIC BLOOD PRESSURE: 160 MMHG | RESPIRATION RATE: 18 BRPM | OXYGEN SATURATION: 97 %

## 2024-08-03 DIAGNOSIS — S33.5XXA SPRAIN OF LOW BACK, INITIAL ENCOUNTER: Primary | ICD-10-CM

## 2024-08-03 PROCEDURE — 99213 OFFICE O/P EST LOW 20 MIN: CPT | Mod: 25 | Performed by: FAMILY MEDICINE

## 2024-08-03 PROCEDURE — 96372 THER/PROPH/DIAG INJ SC/IM: CPT | Performed by: FAMILY MEDICINE

## 2024-08-03 RX ORDER — MELOXICAM 15 MG/1
15 TABLET ORAL DAILY
Qty: 30 TABLET | Refills: 0 | Status: SHIPPED | OUTPATIENT
Start: 2024-08-03 | End: 2024-09-09

## 2024-08-03 RX ORDER — KETOROLAC TROMETHAMINE 30 MG/ML
60 INJECTION, SOLUTION INTRAMUSCULAR; INTRAVENOUS ONCE
Status: COMPLETED | OUTPATIENT
Start: 2024-08-03 | End: 2024-08-03

## 2024-08-03 RX ORDER — BACLOFEN 10 MG/1
10 TABLET ORAL 3 TIMES DAILY
Qty: 30 TABLET | Refills: 0 | Status: SHIPPED | OUTPATIENT
Start: 2024-08-03 | End: 2024-08-14

## 2024-08-03 RX ADMIN — KETOROLAC TROMETHAMINE 60 MG: 30 INJECTION, SOLUTION INTRAMUSCULAR; INTRAVENOUS at 10:49

## 2024-08-03 ASSESSMENT — PAIN SCALES - GENERAL: PAINLEVEL: EXTREME PAIN (8)

## 2024-08-03 NOTE — PATIENT INSTRUCTIONS
Start on baclofen ( muscle relaxant) three times daily until improved  For pain:  Stop ibuprofen.  Start on meloxicam 15 mg once daily ( begin tonight)  You can add in Tylenol 325 mg - 2 tabs every 6 hours as needed  Gentle movement  Can use ice/ heat to low back  If not improving, consider PT / chiropractic care

## 2024-08-03 NOTE — PROGRESS NOTES
Milton was seen today for back pain.    Diagnoses and all orders for this visit:    Sprain of low back, initial encounter  -     ketorolac (TORADOL) injection 60 mg  -     baclofen (LIORESAL) 10 MG tablet; Take 1 tablet (10 mg) by mouth 3 times daily  -     meloxicam (MOBIC) 15 MG tablet; Take 1 tablet (15 mg) by mouth daily      Will give him Toradol injection for acute pain then start on muscle relaxant/ anti-inflammatory.    Patient Instructions   Start on baclofen ( muscle relaxant) three times daily until improved  For pain:  Stop ibuprofen.  Start on meloxicam 15 mg once daily ( begin tonight)  You can add in Tylenol 325 mg - 2 tabs every 6 hours as needed  Gentle movement  Can use ice/ heat to low back  If not improving, consider PT / chiropractic care       Subjective   Marshall Kendrick is a 54 year old is presenting for the following health issues:  Low back pain      HPI : Marshall Kendrick is here with recurrent low back pain.  Last episode was about 5 years ago.  Recently he ducked down and took a step to try and get into his truck then had new onset of pain across the lower back 3 days ago in the afternoon.  Had hard time getting out of bed this am.  The pain is worse with certain movements and then his muscles will lock up. Feels pain radiates to his groin.    He has been taking 400 mg ibuprofen every few hours.  Tried ice to low back.  Has been taking aspirin 3 every few hours as well but not helping a lot.    Was prescribed meloxicam for gout in April but did not fill rx.  Denies any significant ibuprofen allergy.      Review of Systems: No leg pain / numbness or tingling or weakness in lower extremities.  No changes in bowel or bladder function.    Patient Active Problem List   Diagnosis    Primary hypertension    Idiopathic chronic gout of multiple sites without tophus    Alcohol use disorder in remission    Acquired hypothyroidism    TEZ (generalized anxiety disorder)    Dyslipidemia      Normal  kidney function.        Objective:  BP (!) 160/108 (BP Location: Right arm, Patient Position: Sitting, Cuff Size: Adult Large)   Pulse 120   Temp 97.5  F (36.4  C) (Tympanic)   Resp 18   Wt (!) 156.8 kg (345 lb 11.2 oz)   SpO2 97%   BMI 43.72 kg/m   He is very uncomfortable with ambulation and walks hunched over.  Back: Mild tenderness over the left / right lower lumbar/ sacral areas.  No spinal tenderness.  lower extremities: DTRs symmetric bilaterally.  Motor - resisted plantar/ dorsiflexion, extension and flexion of legs is 5/5 equal bilaterally. Sensation intact to light touch on dorsum of feet bilaterally.              Brandy Morgan MD

## 2024-08-14 ENCOUNTER — OFFICE VISIT (OUTPATIENT)
Dept: FAMILY MEDICINE | Facility: CLINIC | Age: 54
End: 2024-08-14
Payer: COMMERCIAL

## 2024-08-14 VITALS
HEIGHT: 75 IN | BODY MASS INDEX: 39.17 KG/M2 | DIASTOLIC BLOOD PRESSURE: 100 MMHG | SYSTOLIC BLOOD PRESSURE: 168 MMHG | HEART RATE: 79 BPM | TEMPERATURE: 98 F | WEIGHT: 315 LBS | OXYGEN SATURATION: 98 %

## 2024-08-14 DIAGNOSIS — E03.9 ACQUIRED HYPOTHYROIDISM: ICD-10-CM

## 2024-08-14 DIAGNOSIS — R10.84 ABDOMINAL PAIN, GENERALIZED: Primary | ICD-10-CM

## 2024-08-14 DIAGNOSIS — S33.5XXA SPRAIN OF LOW BACK, INITIAL ENCOUNTER: ICD-10-CM

## 2024-08-14 LAB
CHOLEST SERPL-MCNC: 172 MG/DL
ERYTHROCYTE [DISTWIDTH] IN BLOOD BY AUTOMATED COUNT: 14.4 % (ref 10–15)
FASTING STATUS PATIENT QL REPORTED: NO
HCT VFR BLD AUTO: 44.9 % (ref 40–53)
HDLC SERPL-MCNC: 35 MG/DL
HGB BLD-MCNC: 14.5 G/DL (ref 13.3–17.7)
HIV 1+2 AB+HIV1 P24 AG SERPL QL IA: NONREACTIVE
LDLC SERPL CALC-MCNC: 93 MG/DL
MCH RBC QN AUTO: 29.7 PG (ref 26.5–33)
MCHC RBC AUTO-ENTMCNC: 32.3 G/DL (ref 31.5–36.5)
MCV RBC AUTO: 92 FL (ref 78–100)
NONHDLC SERPL-MCNC: 137 MG/DL
PLATELET # BLD AUTO: 200 10E3/UL (ref 150–450)
RBC # BLD AUTO: 4.88 10E6/UL (ref 4.4–5.9)
T4 FREE SERPL-MCNC: 0.85 NG/DL (ref 0.9–1.7)
TRIGL SERPL-MCNC: 222 MG/DL
TSH SERPL DL<=0.005 MIU/L-ACNC: 16.9 UIU/ML (ref 0.3–4.2)
WBC # BLD AUTO: 9.1 10E3/UL (ref 4–11)

## 2024-08-14 PROCEDURE — 84443 ASSAY THYROID STIM HORMONE: CPT | Performed by: PHYSICIAN ASSISTANT

## 2024-08-14 PROCEDURE — 86803 HEPATITIS C AB TEST: CPT | Performed by: PHYSICIAN ASSISTANT

## 2024-08-14 PROCEDURE — 36415 COLL VENOUS BLD VENIPUNCTURE: CPT | Performed by: PHYSICIAN ASSISTANT

## 2024-08-14 PROCEDURE — 99214 OFFICE O/P EST MOD 30 MIN: CPT | Performed by: PHYSICIAN ASSISTANT

## 2024-08-14 PROCEDURE — 84439 ASSAY OF FREE THYROXINE: CPT | Performed by: PHYSICIAN ASSISTANT

## 2024-08-14 PROCEDURE — 87389 HIV-1 AG W/HIV-1&-2 AB AG IA: CPT | Performed by: PHYSICIAN ASSISTANT

## 2024-08-14 PROCEDURE — 80061 LIPID PANEL: CPT | Performed by: PHYSICIAN ASSISTANT

## 2024-08-14 PROCEDURE — 85027 COMPLETE CBC AUTOMATED: CPT | Performed by: PHYSICIAN ASSISTANT

## 2024-08-14 RX ORDER — BACLOFEN 10 MG/1
10 TABLET ORAL 3 TIMES DAILY
Qty: 30 TABLET | Refills: 0 | Status: SHIPPED | OUTPATIENT
Start: 2024-08-14 | End: 2024-09-09

## 2024-08-14 NOTE — PROGRESS NOTES
"  Assessment & Plan     (R10.84) Abdominal pain, generalized  (primary encounter diagnosis)  Comment: Persistent riled worsening  Plan: CBC with platelets, CT Abdomen Pelvis w         Contrast        Get labs and CT scan  If his symptoms acutely worsen as we discussed with fever chills significant pain recurrent emesis etc. he will go to the ER  (S33.5XXA) Sprain of low back, initial encounter  Comment: Improved  Plan: baclofen (LIORESAL) 10 MG tablet        Refill the baclofen off work for 1 day    (E03.9) Acquired hypothyroidism  Comment: Has been back on his meds for some time will check labs  Plan: TSH with free T4 reflex        Follow-up as needed          BMI  Estimated body mass index is 43.17 kg/m  as calculated from the following:    Height as of this encounter: 1.892 m (6' 2.5\").    Weight as of this encounter: 154.6 kg (340 lb 12.8 oz).             Maria De Jesus Rose is a 54 year old, presenting for the following health issues: having some pain across the top of his abdomen and wraps around both sides, nausea, feeling anxious  Anxiety, Abdominal Pain, and Nausea        8/14/2024     3:36 PM   Additional Questions   Roomed by ellen simpson   Accompanied by self     54-year-old (the clinic with abdominal pain bilateral in the area lateral to the umbilicus  Some nausea has been noted does not seem to change with food or liquid consumption  No fever has had some chills occasionally has a looser stools no blood or mucus  There is been no bulge  The back pain that he was in for recently has improved he would like a refill of his baclofen  He said no urinary symptoms and we did a UA at his last visit which was unremarkable  Has not had pain like this before  The pain makes him anxious  He is not sleeping well    History of Present Illness       Reason for visit:  Sides still hurting feeling of  nasua and fatigue and anxiety feelings    He eats 0-1 servings of fruits and vegetables daily.He consumes 2 sweetened " "beverage(s) daily.He exercises with enough effort to increase his heart rate 20 to 29 minutes per day.  He exercises with enough effort to increase his heart rate 4 days per week. He is missing 1 dose(s) of medications per week.  He is not taking prescribed medications regularly due to remembering to take.              Objective    BP (!) 168/100 (BP Location: Right arm, Patient Position: Sitting)   Pulse 79   Temp 98  F (36.7  C)   Ht 1.892 m (6' 2.5\")   Wt (!) 154.6 kg (340 lb 12.8 oz)   SpO2 98%   BMI 43.17 kg/m    Body mass index is 43.17 kg/m .  Physical Exam attentive no acute distress  Lungs clear ventilated  Cardiovascular and rhythm  No rash noted  Normal active bowel sounds  Soft abdomen with some diffuse tenderness no guarding or rebound noted              Signed Electronically by: LORENA Chávez    "

## 2024-08-14 NOTE — LETTER
August 14, 2024      Marshall Kendrick  [address] [facility] [address] [place] [id number]        To Whom It May Concern:    Marhsall Kendrick  was seen on 08/14/2024.  Please excuse him  until 08/16/2024 due to illness.        Sincerely,        LORENA Chávez

## 2024-08-15 LAB — HCV AB SERPL QL IA: NONREACTIVE

## 2024-09-09 ENCOUNTER — OFFICE VISIT (OUTPATIENT)
Dept: FAMILY MEDICINE | Facility: CLINIC | Age: 54
End: 2024-09-09
Payer: COMMERCIAL

## 2024-09-09 VITALS
DIASTOLIC BLOOD PRESSURE: 78 MMHG | RESPIRATION RATE: 18 BRPM | BODY MASS INDEX: 39.17 KG/M2 | TEMPERATURE: 97.5 F | HEIGHT: 75 IN | OXYGEN SATURATION: 98 % | SYSTOLIC BLOOD PRESSURE: 134 MMHG | WEIGHT: 315 LBS | HEART RATE: 69 BPM

## 2024-09-09 DIAGNOSIS — R11.0 NAUSEA: Primary | ICD-10-CM

## 2024-09-09 DIAGNOSIS — K29.60 OTHER GASTRITIS WITHOUT HEMORRHAGE, UNSPECIFIED CHRONICITY: ICD-10-CM

## 2024-09-09 PROCEDURE — 99213 OFFICE O/P EST LOW 20 MIN: CPT | Performed by: PHYSICIAN ASSISTANT

## 2024-09-09 RX ORDER — ONDANSETRON 4 MG/1
4 TABLET, ORALLY DISINTEGRATING ORAL EVERY 8 HOURS PRN
COMMUNITY
Start: 2024-08-21 | End: 2024-09-09

## 2024-09-09 RX ORDER — ONDANSETRON 4 MG/1
4 TABLET, ORALLY DISINTEGRATING ORAL EVERY 8 HOURS PRN
Qty: 15 TABLET | Refills: 1 | Status: SHIPPED | OUTPATIENT
Start: 2024-09-09

## 2024-09-09 NOTE — PROGRESS NOTES
"  Assessment & Plan     (R11.0) Nausea  (primary encounter diagnosis)  Comment: Refill Zofran  Plan: ondansetron (ZOFRAN ODT) 4 MG ODT tab        Follow-up as needed    (K29.60) Other gastritis without hemorrhage, unspecified chronicity  Comment: Continue omeprazole  Plan: We did discuss GI referral he wants to give it another couple of weeks he will follow-up at that time prior as needed        MED REC REQUIRED  Post Medication Reconciliation Status:   BMI  Estimated body mass index is 41.85 kg/m  as calculated from the following:    Height as of this encounter: 1.892 m (6' 2.5\").    Weight as of this encounter: 149.9 kg (330 lb 6.4 oz).             Subjective   Milton is a 54 year old, presenting for the following health issues:  ER F/U (8/21/24 [facility] for gastritis) and Follow Up (Anxiety )        9/9/2024     8:46 AM   Additional Questions   Roomed by DAMIÁN Saldana     Patient is here to follow-up for gastrointestinal symptoms.  He has cut back on caffeine  He did fall off the wagon and consumed alcohol for a couple of days  He is still smoking  He found that his job was too stressful and so he quit he is looking for other forms of employment  He recently was in the emergency department for his abdominal pain he was given Zofran and omeprazole  Again he has noted improvement but not resolution  He has lost about 10 pounds  His appetite is slowly coming back  He still has some nausea  His CT and blood work were all unremarkable    History of Present Illness       Reason for visit:  Follow up stomch and anxiety    He eats 2-3 servings of fruits and vegetables daily.He consumes 2 sweetened beverage(s) daily.He exercises with enough effort to increase his heart rate 9 or less minutes per day.  He exercises with enough effort to increase his heart rate 3 or less days per week.   He is taking medications regularly.                   Objective    BP (!) 148/78 (BP Location: Right arm, Patient Position: Sitting, " "Cuff Size: Adult Large)   Pulse 69   Temp 97.5  F (36.4  C) (Tympanic)   Resp 18   Ht 1.892 m (6' 2.5\")   Wt 149.9 kg (330 lb 6.4 oz)   SpO2 98%   BMI 41.85 kg/m    Body mass index is 41.85 kg/m .  Physical Exam attentive no acute distress  Smells of tobacco  Lungs clear ventilated  Cardiovascular rhythm  Abdomen normal active bowel sounds soft no tenderness no guarding no rebound no hepatosplenomegaly              Signed Electronically by: LORENA Chávez    "